# Patient Record
Sex: MALE | Race: WHITE | Employment: PART TIME | URBAN - METROPOLITAN AREA
[De-identification: names, ages, dates, MRNs, and addresses within clinical notes are randomized per-mention and may not be internally consistent; named-entity substitution may affect disease eponyms.]

---

## 2019-04-21 ENCOUNTER — APPOINTMENT (OUTPATIENT)
Dept: GENERAL RADIOLOGY | Facility: CLINIC | Age: 29
End: 2019-04-21
Attending: PHYSICIAN ASSISTANT
Payer: COMMERCIAL

## 2019-04-21 ENCOUNTER — HOSPITAL ENCOUNTER (EMERGENCY)
Facility: CLINIC | Age: 29
Discharge: HOME | End: 2019-04-21
Admitting: PHYSICIAN ASSISTANT
Payer: COMMERCIAL

## 2019-04-21 VITALS
RESPIRATION RATE: 16 BRPM | OXYGEN SATURATION: 100 % | TEMPERATURE: 98.1 F | SYSTOLIC BLOOD PRESSURE: 137 MMHG | DIASTOLIC BLOOD PRESSURE: 70 MMHG

## 2019-04-21 DIAGNOSIS — S99.922A INJURY OF LEFT FOOT, INITIAL ENCOUNTER: ICD-10-CM

## 2019-04-21 PROCEDURE — 73630 X-RAY EXAM OF FOOT: CPT | Mod: LT

## 2019-04-21 PROCEDURE — 29515 APPLICATION SHORT LEG SPLINT: CPT | Mod: LT

## 2019-04-21 PROCEDURE — 99284 EMERGENCY DEPT VISIT MOD MDM: CPT | Mod: 25

## 2019-04-21 PROCEDURE — 25000132 ZZH RX MED GY IP 250 OP 250 PS 637: Performed by: PHYSICIAN ASSISTANT

## 2019-04-21 PROCEDURE — 73610 X-RAY EXAM OF ANKLE: CPT | Mod: LT

## 2019-04-21 RX ORDER — ACETAMINOPHEN 500 MG
1000 TABLET ORAL ONCE
Status: COMPLETED | OUTPATIENT
Start: 2019-04-21 | End: 2019-04-21

## 2019-04-21 RX ORDER — IBUPROFEN 600 MG/1
600 TABLET, FILM COATED ORAL ONCE
Status: COMPLETED | OUTPATIENT
Start: 2019-04-21 | End: 2019-04-21

## 2019-04-21 RX ADMIN — IBUPROFEN 600 MG: 600 TABLET ORAL at 17:38

## 2019-04-21 RX ADMIN — ACETAMINOPHEN 1000 MG: 500 TABLET, FILM COATED ORAL at 17:38

## 2019-04-21 ASSESSMENT — ENCOUNTER SYMPTOMS: NUMBNESS: 0

## 2019-04-21 NOTE — ED AVS SNAPSHOT
[facility]  [address]  [facility] [address]  Phone:  [phone]  Fax:  [phone]                                    J Luis Cruz   MRN: [id number]    Department:  [facility] Emergency Department   Date of Visit:  4/21/2019           After Visit Summary Signature Page    I have received my discharge instructions, and my questions have been answered. I have discussed any challenges I see with this plan with the nurse or doctor.    ..........................................................................................................................................  Patient/Patient Representative Signature      ..........................................................................................................................................  Patient Representative Print Name and Relationship to Patient    ..................................................               ................................................  Date                                   Time    ..........................................................................................................................................  Reviewed by Signature/Title    ...................................................              ..............................................  Date                                               Time          22EPIC Rev 08/18

## 2019-04-21 NOTE — ED PROVIDER NOTES
History     Chief Complaint:  Left Foot/Ankle Pain    HPI   Patric Cruz is a 28 year old male who presents with left ankle and foot pain. The patient reports he was jumping off of a diving board into a pool today, but hesitated when his dog jumped in front of him, causing the corner of the diving board to hit his left foot. Since then the patient states he has had pain and swelling to his left foot, as well as some tenderness to his left ankle. He states he can move his toes, but with pain and has not been able to bear weight on his foot since the injury. The patient otherwise denies any heel pain, numbness, tingling or any past injuries or surgeries to the affected area.     Allergies:  No known drug allergies.     Medications:    Nasonex NA    Past Medical History:    The patient does not have any past pertinent medical history.    Past Surgical History:    History reviewed. No pertinent surgical history.    Family History:    History reviewed. No pertinent family history.     Social History:  Smoking Status: Never Smoker  Patient presents with family/friend.    Review of Systems   Musculoskeletal: Negative for gait problem.        + Left foot and ankle pain   Neurological: Negative for numbness.   All other systems reviewed and are negative.    Physical Exam     Patient Vitals for the past 24 hrs:   BP Temp Temp src Heart Rate Resp SpO2   04/21/19 1719 137/70 98.1  F (36.7  C) Oral 86 16 100 %     Physical Exam  General: Alert and oriented.    Head: Normocephalic. External ears and nose normal.    Eyes: Pupils equal and round.  Normal tracking.    Pulmonary/Chest: Effort and rate normal    MSK:  Focused exam of the left ankle and foot shows diffuse swelling throughout the anterior and lateral ankle and proximal forefoot with tenderness to palpation. Otherwise normal. Range of motion limited secondary to pain and swelling. No tenderness to palpation over distal tibia, medial or lateral malleolus.  Negative  squeeze test for syndesmosis injury.  Grossly intact achilles tendon to palpation.    No tenderness to palpation over patella or proximal tibia/fibula, patella with normal ROM in knee.     SKIN:  Warm and dry with strong DP or posterior tibial pulses and normal capillary refill.    NEURO:  Normal strength and sensation throughout the foot and toes.  Normal strength in ankle.    PSYCH:  Normal affect    Emergency Department Course     Imaging:  Radiographic findings were communicated with the patient who voiced understanding of the findings.    Foot XR, G/E 3 views, left  No evidence for fracture, dislocation or significant  degenerative change of the left foot.   As read by radiology.    XR Ankle Left G/E 3 Views  No evidence for fracture, dislocation or significant  degenerative change of the left ankle.   As read by radiology.    Procedures:    Narrative:      Ortho boot was applied and after placement I checked and adjusted the fit to ensure proper positioning. Patient was more comfortable with boot in place. Sensation and circulation are intact after boot placement.    Interventions:    1738: ibuprofen 600 mg PO  1738: Tylenol 1,000 mg PO    Emergency Department Course:  Past medical records, nursing notes, and vitals reviewed.  1725: I performed an exam of the patient and obtained history, as documented above.    The patient was sent for X-rays while in the emergency department, findings above.     1837: I rechecked the patient. Findings and plan explained to the Patient. Patient discharged home with instructions regarding supportive care, medications, and reasons to return. The importance of close follow-up was reviewed.     Impression & Plan      Medical Decision Making:  J Luis Cruz is a 28 year old male who presents for evaluation of ankle pain.  Signs and symptoms are consistent with a left ankle sprain, a radiograph of the ankle is negative for acute bony injury.  The patients neurovascular status is  normal. A head to toe trauma exam is otherwise negative; the likelihood of other serious sequelae of trauma (spine, head, chest, abdomen, other extremities, pelvis) is very low.  Given the significant degree of swelling I did consider the possibility of occult fracture versus severe ankle sprain and for this reason patient was placed in a orthopedic boot and made nonweightbearing with crutches with instruction to follow-up with orthopedics in 2 to 3 days for further evaluation.  Discussed treatment with rest, ice, compression, and elevation.  Return to ED if any new or worsening symptoms develop.  Diagnosis:    ICD-10-CM    1. Injury of left foot, initial encounter S99.922A        Disposition:  Discharged to home.    Discharge Medications:     Medication List      There are no discharge medications for this visit.           Mely Jimenez  4/21/2019   [facility] EMERGENCY DEPARTMENT  I, Mely Jimenez, am serving as a scribe at 5:25 PM on 4/21/2019 to document services personally performed by Feng Morales PA-C based on my observations and the provider's statements to me.        Feng Morales PA-C  04/21/19 [id number]

## 2019-04-25 ENCOUNTER — COMMUNICATION - HEALTHEAST (OUTPATIENT)
Dept: SCHEDULING | Facility: CLINIC | Age: 29
End: 2019-04-25

## 2019-11-08 ENCOUNTER — HEALTH MAINTENANCE LETTER (OUTPATIENT)
Age: 29
End: 2019-11-08

## 2020-12-06 ENCOUNTER — HEALTH MAINTENANCE LETTER (OUTPATIENT)
Age: 30
End: 2020-12-06

## 2021-05-28 NOTE — TELEPHONE ENCOUNTER
New Appointment Needed  What is the reason for the visit:    Pre-Op Appt Request  When is the surgery? :  4/30/19  Where is the surgery?:   [facility]  Who is the surgeon? :  Dr Carreon  What type of surgery is being done?: foot surgery  Provider Preference: Any available  How soon do you need to be seen?: asap  Waitlist offered?: No  Okay to leave a detailed message:  Yes      Patient also states the surgeon said if there is no time for a pre op then patient just needs blood work done.

## 2021-05-28 NOTE — TELEPHONE ENCOUNTER
I spoke with pt.  In the message below, pt states that surgeon mentioned if there is no time for pre-op, they are okay to just don only blood work.  I informed pt that since he has never been seen in [facility], that it is not possible to only do labs.  Pt would need to be seen.  Unfortunately, we do not have any openings for tomorrow and Monday to do pre-op and pt's surgery is on Tuesday, April 30th. Recommend pt to call around to see if anyone can get pt in for a pre-op.

## 2021-09-25 ENCOUNTER — HEALTH MAINTENANCE LETTER (OUTPATIENT)
Age: 31
End: 2021-09-25

## 2022-01-15 ENCOUNTER — HEALTH MAINTENANCE LETTER (OUTPATIENT)
Age: 32
End: 2022-01-15

## 2023-01-07 ENCOUNTER — HEALTH MAINTENANCE LETTER (OUTPATIENT)
Age: 33
End: 2023-01-07

## 2023-04-22 ENCOUNTER — HEALTH MAINTENANCE LETTER (OUTPATIENT)
Age: 33
End: 2023-04-22

## 2023-05-18 ENCOUNTER — LAB REQUISITION (OUTPATIENT)
Dept: LAB | Facility: CLINIC | Age: 33
End: 2023-05-18
Payer: COMMERCIAL

## 2023-05-18 DIAGNOSIS — Z13.6 ENCOUNTER FOR SCREENING FOR CARDIOVASCULAR DISORDERS: ICD-10-CM

## 2023-05-18 LAB
CHOLEST SERPL-MCNC: 175 MG/DL
HDLC SERPL-MCNC: 62 MG/DL
LDLC SERPL CALC-MCNC: 96 MG/DL
NONHDLC SERPL-MCNC: 113 MG/DL
TRIGL SERPL-MCNC: 86 MG/DL

## 2023-05-18 PROCEDURE — 80061 LIPID PANEL: CPT | Mod: ORL | Performed by: FAMILY MEDICINE

## 2024-10-25 ENCOUNTER — LAB REQUISITION (OUTPATIENT)
Dept: LAB | Facility: CLINIC | Age: 34
End: 2024-10-25
Payer: COMMERCIAL

## 2024-10-25 DIAGNOSIS — Z13.6 ENCOUNTER FOR SCREENING FOR CARDIOVASCULAR DISORDERS: ICD-10-CM

## 2024-10-25 PROCEDURE — 80061 LIPID PANEL: CPT | Mod: ORL | Performed by: FAMILY MEDICINE

## 2024-10-26 LAB
CHOLEST SERPL-MCNC: 252 MG/DL
FASTING STATUS PATIENT QL REPORTED: ABNORMAL
HDLC SERPL-MCNC: 68 MG/DL
LDLC SERPL CALC-MCNC: 169 MG/DL
NONHDLC SERPL-MCNC: 184 MG/DL
TRIGL SERPL-MCNC: 74 MG/DL